# Patient Record
Sex: FEMALE
[De-identification: names, ages, dates, MRNs, and addresses within clinical notes are randomized per-mention and may not be internally consistent; named-entity substitution may affect disease eponyms.]

---

## 2017-12-19 NOTE — DIAGNOSTIC IMAGING REPORT
PROCEDURE:

Frontal and lateral views of the chest.

 

COMPARISON: None.

 

INDICATIONS:   PRE OP FOOT

     

FINDINGS:

Lines/tubes:  None.

 

Lungs:  The lungs are well inflated. Linear opacities in the left lower 

lung, projecting in the costophrenic angle on the frontal view, likely 

represent subsegmental atelectasis. There is no evidence of pneumonia 

or pulmonary edema.

 

Pleura:  There is no pleural effusion or pneumothorax.

 

Heart and mediastinum:  The heart and the mediastinum are normal.

 

Bones:  No acute bony abnormality.

 

IMPRESSION: 

 

1.  No acute cardiopulmonary abnormalities.

 

 

Ramses Roche M.D.  

Dictated by:  Ramses Roche M.D. on 12/19/2017 at 17:12     

Electronically approved by:  Ramses Roche M.D. on 12/19/2017 at 

17:12

## 2017-12-21 NOTE — OPERATIVE REPORT
DATE OF PROCEDURE:  December 20, 2017 

 

PREOPERATIVE DIAGNOSIS:  Fractured 5th metatarsal, right foot, multiple 

areas.



POSTOPERATIVE DIAGNOSIS:  Fractured 5th metatarsal, right foot, multiple 

areas.



TITLE OF OPERATION:  Open reduction with internal fixation, 5th metatarsal 

base and 2nd fracture open reduction internal fixation of 5th metatarsal, 

midshaft, both on the right foot.



ANESTHESIA:  General endotracheal.



HEMOSTASIS:  A right thigh tourniquet at 350 mmHg.



PROCEDURE IN DETAIL:  The patient was taken to the operating room in a 

mildly sedated state and placed upon the operating table in the supine 

position.  Following induction of general anesthetic, the right lower 

extremity is elevated to 60 degrees to exsanguinate before inflating the 

pneumatic thigh tourniquet at 350 mmHg for good hemostasis.  The right 

lower extremity was placed on the operating table upon performing the 

following procedure.



PROCEDURE #1:  Open reduction with internal fixation, 5th metatarsal, 

midshaft and distally right foot.  An approximate 6 cm dorsal lateral 

incision was placed over the 5th metatarsal.  The spiral oblique fracture 

was noted from an area just proximal to the 5th met head and by spiraling 

to an area at the proximal one-third of the 5th metatarsal.  This spiral 

oblique fracture was displaced.  It was clamped together with a bone clamp. 

 K-wires were advanced and a cannulated screw was advanced across the 

oblique fracture.  A small utility plate was placed across the dorsal 

aspect of the fixated 5th metatarsal fracture with screw on either side of 

the fracture fragment in a compression manner that allowed for excellent 

reaction and fixation of the significantly displaced midshaft fracture.  

The area was then irrigated and closed with 3-0 Vicryl and 4-0 nylon.  



Attention was then directed to the 5th metatarsal base styloid process 

where an avulsion fracture was noted.  Incision was placed overlying the 

peroneal tendon insertion, and that was dissected and reflected free.  The 

2 separate fracture fragments were reduced with fracture forceps, and the 

area was temporarily fixated with K-wires.  Two bone staples were then 

applied to the 5th met base fracture in a compressive manner.  The area was 

irrigated.  This was all under fluoroscopy.  Deep closure was 3-0 Vicryl.  

Human tissue allograft was injected in a flowable form to facilitate 

healing.  Three-0 Vicryl and 4-0 nylon and the appropriate mildly 

compressive dressings were applied.  The area was blocked with 0.5 

Marcaine.  The appropriate mildly compressive dressings with a posterior 

splint was applied.  Released the pneumatic thigh tourniquet and showed a 

normal hyperemic flush to all digits of the right foot.  Patient left the 

operating room with vital signs stable and in apparent satisfactory 

condition, having tolerated both anesthetic and procedure very well.  

  









DD:  12/20/2017 17:28

DT:  12/21/2017 01:50

Job#:  [id number]

## 2022-08-22 ENCOUNTER — HOSPITAL ENCOUNTER (INPATIENT)
Dept: HOSPITAL 88 - ER | Age: 62
LOS: 3 days | Discharge: HOME | DRG: 177 | End: 2022-08-25
Attending: INTERNAL MEDICINE | Admitting: INTERNAL MEDICINE
Payer: COMMERCIAL

## 2022-08-22 VITALS — BODY MASS INDEX: 38.32 KG/M2 | HEIGHT: 65 IN | WEIGHT: 230 LBS

## 2022-08-22 DIAGNOSIS — Z85.038: ICD-10-CM

## 2022-08-22 DIAGNOSIS — Z85.42: ICD-10-CM

## 2022-08-22 DIAGNOSIS — I10: ICD-10-CM

## 2022-08-22 DIAGNOSIS — E78.5: ICD-10-CM

## 2022-08-22 DIAGNOSIS — U07.1: Primary | ICD-10-CM

## 2022-08-22 DIAGNOSIS — J96.01: ICD-10-CM

## 2022-08-22 DIAGNOSIS — E11.9: ICD-10-CM

## 2022-08-22 DIAGNOSIS — E78.00: ICD-10-CM

## 2022-08-22 DIAGNOSIS — Z88.8: ICD-10-CM

## 2022-08-22 DIAGNOSIS — Z82.49: ICD-10-CM

## 2022-08-22 DIAGNOSIS — Z80.6: ICD-10-CM

## 2022-08-22 DIAGNOSIS — Z79.84: ICD-10-CM

## 2022-08-22 DIAGNOSIS — Z88.5: ICD-10-CM

## 2022-08-22 DIAGNOSIS — J44.1: ICD-10-CM

## 2022-08-22 LAB
ALBUMIN SERPL-MCNC: 3.6 G/DL (ref 3.5–5)
ALBUMIN/GLOB SERPL: 0.9 {RATIO} (ref 0.8–2)
ALP SERPL-CCNC: 91 IU/L (ref 40–150)
ALT SERPL-CCNC: 15 IU/L (ref 0–55)
ANION GAP SERPL CALC-SCNC: 16.9 MMOL/L (ref 8–16)
BASOPHILS # BLD AUTO: 0.1 10*3/UL (ref 0–0.1)
BASOPHILS NFR BLD AUTO: 0.8 % (ref 0–1)
BUN SERPL-MCNC: 13 MG/DL (ref 7–26)
BUN/CREAT SERPL: 15 (ref 6–25)
CALCIUM SERPL-MCNC: 9.1 MG/DL (ref 8.4–10.2)
CHLORIDE SERPL-SCNC: 105 MMOL/L (ref 98–107)
CK MB SERPL-MCNC: 2.5 NG/ML (ref 0–5)
CK SERPL-CCNC: 172 IU/L (ref 29–168)
CO2 SERPL-SCNC: 25 MMOL/L (ref 22–29)
DEPRECATED NEUTROPHILS # BLD AUTO: 8.5 10*3/UL (ref 2.1–6.9)
EOSINOPHIL # BLD AUTO: 1 10*3/UL (ref 0–0.4)
EOSINOPHIL NFR BLD AUTO: 7.5 % (ref 0–6)
ERYTHROCYTE [DISTWIDTH] IN CORD BLOOD: 13.4 % (ref 11.7–14.4)
GLOBULIN PLAS-MCNC: 3.8 G/DL (ref 2.3–3.5)
GLUCOSE SERPLBLD-MCNC: 198 MG/DL (ref 74–118)
HCT VFR BLD AUTO: 42.3 % (ref 34.2–44.1)
HGB BLD-MCNC: 12.8 G/DL (ref 12–16)
LYMPHOCYTES # BLD: 3.1 10*3/UL (ref 1–3.2)
LYMPHOCYTES NFR BLD AUTO: 22.5 % (ref 18–39.1)
MCH RBC QN AUTO: 26.9 PG (ref 28–32)
MCHC RBC AUTO-ENTMCNC: 30.3 G/DL (ref 31–35)
MCV RBC AUTO: 89.1 FL (ref 81–99)
MONOCYTES # BLD AUTO: 0.9 10*3/UL (ref 0.2–0.8)
MONOCYTES NFR BLD AUTO: 6.6 % (ref 4.4–11.3)
NEUTS SEG NFR BLD AUTO: 62.2 % (ref 38.7–80)
PLATELET # BLD AUTO: 270 X10E3/UL (ref 140–360)
POTASSIUM SERPL-SCNC: 3.9 MMOL/L (ref 3.5–5.1)
RBC # BLD AUTO: 4.75 X10E6/UL (ref 3.6–5.1)
SODIUM SERPL-SCNC: 143 MMOL/L (ref 136–145)

## 2022-08-22 PROCEDURE — 83690 ASSAY OF LIPASE: CPT

## 2022-08-22 PROCEDURE — 87040 BLOOD CULTURE FOR BACTERIA: CPT

## 2022-08-22 PROCEDURE — 80053 COMPREHEN METABOLIC PANEL: CPT

## 2022-08-22 PROCEDURE — 94760 N-INVAS EAR/PLS OXIMETRY 1: CPT

## 2022-08-22 PROCEDURE — 96372 THER/PROPH/DIAG INJ SC/IM: CPT

## 2022-08-22 PROCEDURE — 93005 ELECTROCARDIOGRAM TRACING: CPT

## 2022-08-22 PROCEDURE — 82948 REAGENT STRIP/BLOOD GLUCOSE: CPT

## 2022-08-22 PROCEDURE — 36415 COLL VENOUS BLD VENIPUNCTURE: CPT

## 2022-08-22 PROCEDURE — 99284 EMERGENCY DEPT VISIT MOD MDM: CPT

## 2022-08-22 PROCEDURE — 83036 HEMOGLOBIN GLYCOSYLATED A1C: CPT

## 2022-08-22 PROCEDURE — 82553 CREATINE MB FRACTION: CPT

## 2022-08-22 PROCEDURE — 83880 ASSAY OF NATRIURETIC PEPTIDE: CPT

## 2022-08-22 PROCEDURE — 84484 ASSAY OF TROPONIN QUANT: CPT

## 2022-08-22 PROCEDURE — 94799 UNLISTED PULMONARY SVC/PX: CPT

## 2022-08-22 PROCEDURE — 71260 CT THORAX DX C+: CPT

## 2022-08-22 PROCEDURE — 85025 COMPLETE CBC W/AUTO DIFF WBC: CPT

## 2022-08-22 PROCEDURE — 85379 FIBRIN DEGRADATION QUANT: CPT

## 2022-08-22 PROCEDURE — 94640 AIRWAY INHALATION TREATMENT: CPT

## 2022-08-22 PROCEDURE — 83605 ASSAY OF LACTIC ACID: CPT

## 2022-08-22 PROCEDURE — 94664 DEMO&/EVAL PT USE INHALER: CPT

## 2022-08-22 PROCEDURE — 82550 ASSAY OF CK (CPK): CPT

## 2022-08-23 VITALS — DIASTOLIC BLOOD PRESSURE: 81 MMHG | SYSTOLIC BLOOD PRESSURE: 158 MMHG

## 2022-08-23 VITALS — DIASTOLIC BLOOD PRESSURE: 67 MMHG | SYSTOLIC BLOOD PRESSURE: 143 MMHG

## 2022-08-23 VITALS — DIASTOLIC BLOOD PRESSURE: 86 MMHG | SYSTOLIC BLOOD PRESSURE: 131 MMHG

## 2022-08-23 VITALS — SYSTOLIC BLOOD PRESSURE: 112 MMHG | DIASTOLIC BLOOD PRESSURE: 71 MMHG

## 2022-08-23 VITALS — DIASTOLIC BLOOD PRESSURE: 71 MMHG | SYSTOLIC BLOOD PRESSURE: 146 MMHG

## 2022-08-23 VITALS — SYSTOLIC BLOOD PRESSURE: 140 MMHG | DIASTOLIC BLOOD PRESSURE: 82 MMHG

## 2022-08-23 LAB
ALBUMIN SERPL-MCNC: 3.4 G/DL (ref 3.5–5)
ALBUMIN/GLOB SERPL: 0.9 {RATIO} (ref 0.8–2)
ALP SERPL-CCNC: 90 IU/L (ref 40–150)
ALT SERPL-CCNC: 15 IU/L (ref 0–55)
ANION GAP SERPL CALC-SCNC: 15.9 MMOL/L (ref 8–16)
BASOPHILS # BLD AUTO: 0 10*3/UL (ref 0–0.1)
BASOPHILS NFR BLD AUTO: 0.3 % (ref 0–1)
BUN SERPL-MCNC: 10 MG/DL (ref 7–26)
BUN/CREAT SERPL: 12 (ref 6–25)
CALCIUM SERPL-MCNC: 8.6 MG/DL (ref 8.4–10.2)
CHLORIDE SERPL-SCNC: 107 MMOL/L (ref 98–107)
CK MB SERPL-MCNC: 2.7 NG/ML (ref 0–5)
CK MB SERPL-MCNC: 2.7 NG/ML (ref 0–5)
CK SERPL-CCNC: 123 IU/L (ref 29–168)
CK SERPL-CCNC: 141 IU/L (ref 29–168)
CO2 SERPL-SCNC: 21 MMOL/L (ref 22–29)
DEPRECATED NEUTROPHILS # BLD AUTO: 10 10*3/UL (ref 2.1–6.9)
EOSINOPHIL # BLD AUTO: 0 10*3/UL (ref 0–0.4)
EOSINOPHIL NFR BLD AUTO: 0.1 % (ref 0–6)
ERYTHROCYTE [DISTWIDTH] IN CORD BLOOD: 13.2 % (ref 11.7–14.4)
GLOBULIN PLAS-MCNC: 3.9 G/DL (ref 2.3–3.5)
GLUCOSE SERPLBLD-MCNC: 336 MG/DL (ref 74–118)
HCT VFR BLD AUTO: 41.9 % (ref 34.2–44.1)
HGB BLD-MCNC: 13 G/DL (ref 12–16)
LYMPHOCYTES # BLD: 0.7 10*3/UL (ref 1–3.2)
LYMPHOCYTES NFR BLD AUTO: 6.6 % (ref 18–39.1)
LYMPHOCYTES NFR BLD MANUAL: 4 % (ref 19–48)
MCH RBC QN AUTO: 27 PG (ref 28–32)
MCHC RBC AUTO-ENTMCNC: 31 G/DL (ref 31–35)
MCV RBC AUTO: 86.9 FL (ref 81–99)
MONOCYTES # BLD AUTO: 0.1 10*3/UL (ref 0.2–0.8)
MONOCYTES NFR BLD AUTO: 0.5 % (ref 4.4–11.3)
MONOCYTES NFR BLD MANUAL: 1 % (ref 3.4–9)
NEUTS BAND NFR BLD MANUAL: 1 %
NEUTS SEG NFR BLD AUTO: 91.8 % (ref 38.7–80)
NEUTS SEG NFR BLD MANUAL: 94 % (ref 40–74)
PLAT MORPH BLD: NORMAL
PLATELET # BLD AUTO: 246 X10E3/UL (ref 140–360)
PLATELET # BLD EST: ADEQUATE 10*3/UL
POTASSIUM SERPL-SCNC: 3.9 MMOL/L (ref 3.5–5.1)
RBC # BLD AUTO: 4.82 X10E6/UL (ref 3.6–5.1)
SODIUM SERPL-SCNC: 140 MMOL/L (ref 136–145)

## 2022-08-23 PROCEDURE — XW033E5 INTRODUCTION OF REMDESIVIR ANTI-INFECTIVE INTO PERIPHERAL VEIN, PERCUTANEOUS APPROACH, NEW TECHNOLOGY GROUP 5: ICD-10-PCS

## 2022-08-23 RX ADMIN — ENOXAPARIN SODIUM SCH MG: 40 INJECTION SUBCUTANEOUS at 16:50

## 2022-08-23 RX ADMIN — INSULIN LISPRO SCH UNIT: 100 INJECTION, SOLUTION INTRAVENOUS; SUBCUTANEOUS at 21:33

## 2022-08-23 RX ADMIN — FLUTICASONE PROPIONATE AND SALMETEROL SCH EA: 50; 250 POWDER RESPIRATORY (INHALATION) at 21:40

## 2022-08-23 RX ADMIN — METHYLPREDNISOLONE SODIUM SUCCINATE SCH MG: 40 INJECTION, POWDER, LYOPHILIZED, FOR SOLUTION INTRAMUSCULAR; INTRAVENOUS at 10:04

## 2022-08-23 RX ADMIN — INSULIN LISPRO SCH UNIT: 100 INJECTION, SOLUTION INTRAVENOUS; SUBCUTANEOUS at 09:10

## 2022-08-23 RX ADMIN — METHYLPREDNISOLONE SODIUM SUCCINATE SCH MG: 40 INJECTION, POWDER, LYOPHILIZED, FOR SOLUTION INTRAMUSCULAR; INTRAVENOUS at 13:43

## 2022-08-23 RX ADMIN — INSULIN LISPRO SCH UNIT: 100 INJECTION, SOLUTION INTRAVENOUS; SUBCUTANEOUS at 11:54

## 2022-08-23 RX ADMIN — METHYLPREDNISOLONE SODIUM SUCCINATE SCH MG: 40 INJECTION, POWDER, LYOPHILIZED, FOR SOLUTION INTRAMUSCULAR; INTRAVENOUS at 21:32

## 2022-08-23 RX ADMIN — SIMVASTATIN SCH MG: 20 TABLET, FILM COATED ORAL at 21:32

## 2022-08-23 RX ADMIN — INSULIN LISPRO SCH UNIT: 100 INJECTION, SOLUTION INTRAVENOUS; SUBCUTANEOUS at 16:44

## 2022-08-23 RX ADMIN — MONTELUKAST SODIUM SCH MG: 10 TABLET, FILM COATED ORAL at 11:53

## 2022-08-23 RX ADMIN — INSULIN GLARGINE SCH UNITS: 100 INJECTION, SOLUTION SUBCUTANEOUS at 21:34

## 2022-08-24 VITALS — SYSTOLIC BLOOD PRESSURE: 128 MMHG | DIASTOLIC BLOOD PRESSURE: 68 MMHG

## 2022-08-24 VITALS — DIASTOLIC BLOOD PRESSURE: 68 MMHG | SYSTOLIC BLOOD PRESSURE: 128 MMHG

## 2022-08-24 VITALS — DIASTOLIC BLOOD PRESSURE: 75 MMHG | SYSTOLIC BLOOD PRESSURE: 134 MMHG

## 2022-08-24 VITALS — DIASTOLIC BLOOD PRESSURE: 54 MMHG | SYSTOLIC BLOOD PRESSURE: 113 MMHG

## 2022-08-24 VITALS — DIASTOLIC BLOOD PRESSURE: 70 MMHG | SYSTOLIC BLOOD PRESSURE: 122 MMHG

## 2022-08-24 VITALS — SYSTOLIC BLOOD PRESSURE: 113 MMHG | DIASTOLIC BLOOD PRESSURE: 54 MMHG

## 2022-08-24 VITALS — DIASTOLIC BLOOD PRESSURE: 82 MMHG | SYSTOLIC BLOOD PRESSURE: 145 MMHG

## 2022-08-24 RX ADMIN — INSULIN LISPRO SCH UNIT: 100 INJECTION, SOLUTION INTRAVENOUS; SUBCUTANEOUS at 17:40

## 2022-08-24 RX ADMIN — INSULIN GLARGINE SCH UNITS: 100 INJECTION, SOLUTION SUBCUTANEOUS at 21:00

## 2022-08-24 RX ADMIN — SIMVASTATIN SCH MG: 20 TABLET, FILM COATED ORAL at 21:21

## 2022-08-24 RX ADMIN — ENOXAPARIN SODIUM SCH MG: 40 INJECTION SUBCUTANEOUS at 17:25

## 2022-08-24 RX ADMIN — AMLODIPINE BESYLATE SCH MG: 5 TABLET ORAL at 11:49

## 2022-08-24 RX ADMIN — INSULIN LISPRO SCH UNIT: 100 INJECTION, SOLUTION INTRAVENOUS; SUBCUTANEOUS at 21:00

## 2022-08-24 RX ADMIN — SODIUM CHLORIDE SCH MLS/HR: 9 INJECTION, SOLUTION INTRAVENOUS at 12:51

## 2022-08-24 RX ADMIN — FLUTICASONE PROPIONATE AND SALMETEROL SCH EA: 50; 250 POWDER RESPIRATORY (INHALATION) at 19:35

## 2022-08-24 RX ADMIN — AZITHROMYCIN SCH MG: 250 TABLET, FILM COATED ORAL at 11:48

## 2022-08-24 RX ADMIN — METHYLPREDNISOLONE SODIUM SUCCINATE SCH MG: 40 INJECTION, POWDER, LYOPHILIZED, FOR SOLUTION INTRAMUSCULAR; INTRAVENOUS at 05:52

## 2022-08-24 RX ADMIN — INSULIN LISPRO SCH UNIT: 100 INJECTION, SOLUTION INTRAVENOUS; SUBCUTANEOUS at 11:52

## 2022-08-24 RX ADMIN — INSULIN LISPRO SCH UNIT: 100 INJECTION, SOLUTION INTRAVENOUS; SUBCUTANEOUS at 07:30

## 2022-08-24 RX ADMIN — MONTELUKAST SODIUM SCH MG: 10 TABLET, FILM COATED ORAL at 11:49

## 2022-08-24 RX ADMIN — METHYLPREDNISOLONE SODIUM SUCCINATE SCH MG: 40 INJECTION, POWDER, LYOPHILIZED, FOR SOLUTION INTRAMUSCULAR; INTRAVENOUS at 21:21

## 2022-08-24 RX ADMIN — BENZONATATE PRN MG: 100 CAPSULE ORAL at 11:45

## 2022-08-24 RX ADMIN — FLUTICASONE PROPIONATE AND SALMETEROL SCH EA: 50; 250 POWDER RESPIRATORY (INHALATION) at 06:36

## 2022-08-25 VITALS — DIASTOLIC BLOOD PRESSURE: 76 MMHG | SYSTOLIC BLOOD PRESSURE: 135 MMHG

## 2022-08-25 VITALS — DIASTOLIC BLOOD PRESSURE: 69 MMHG | SYSTOLIC BLOOD PRESSURE: 120 MMHG

## 2022-08-25 VITALS — SYSTOLIC BLOOD PRESSURE: 123 MMHG | DIASTOLIC BLOOD PRESSURE: 69 MMHG

## 2022-08-25 VITALS — DIASTOLIC BLOOD PRESSURE: 83 MMHG | SYSTOLIC BLOOD PRESSURE: 144 MMHG

## 2022-08-25 VITALS — DIASTOLIC BLOOD PRESSURE: 69 MMHG | SYSTOLIC BLOOD PRESSURE: 123 MMHG

## 2022-08-25 RX ADMIN — INSULIN LISPRO SCH UNIT: 100 INJECTION, SOLUTION INTRAVENOUS; SUBCUTANEOUS at 08:30

## 2022-08-25 RX ADMIN — INSULIN LISPRO SCH UNIT: 100 INJECTION, SOLUTION INTRAVENOUS; SUBCUTANEOUS at 12:23

## 2022-08-25 RX ADMIN — SODIUM CHLORIDE SCH MLS/HR: 9 INJECTION, SOLUTION INTRAVENOUS at 12:21

## 2022-08-25 RX ADMIN — AMLODIPINE BESYLATE SCH MG: 5 TABLET ORAL at 09:32

## 2022-08-25 RX ADMIN — AZITHROMYCIN SCH MG: 250 TABLET, FILM COATED ORAL at 09:32

## 2022-08-25 RX ADMIN — METHYLPREDNISOLONE SODIUM SUCCINATE SCH MG: 40 INJECTION, POWDER, LYOPHILIZED, FOR SOLUTION INTRAMUSCULAR; INTRAVENOUS at 09:31

## 2022-08-25 RX ADMIN — BENZONATATE PRN MG: 100 CAPSULE ORAL at 09:32

## 2022-08-25 RX ADMIN — FLUTICASONE PROPIONATE AND SALMETEROL SCH EA: 50; 250 POWDER RESPIRATORY (INHALATION) at 07:02

## 2022-08-25 RX ADMIN — MONTELUKAST SODIUM SCH MG: 10 TABLET, FILM COATED ORAL at 09:32
